# Patient Record
Sex: MALE
[De-identification: names, ages, dates, MRNs, and addresses within clinical notes are randomized per-mention and may not be internally consistent; named-entity substitution may affect disease eponyms.]

---

## 2019-06-11 ENCOUNTER — HOSPITAL ENCOUNTER (OUTPATIENT)
Dept: HOSPITAL 88 - US | Age: 69
End: 2019-06-11
Attending: INTERNAL MEDICINE
Payer: MEDICARE

## 2019-06-11 DIAGNOSIS — I10: ICD-10-CM

## 2019-06-11 DIAGNOSIS — R74.8: Primary | ICD-10-CM

## 2019-06-11 DIAGNOSIS — E66.3: ICD-10-CM

## 2019-06-11 DIAGNOSIS — E10.9: ICD-10-CM

## 2019-06-11 LAB
DEPRECATED APTT PLAS QN: 38.2 SECONDS (ref 23.8–35.5)
DEPRECATED INR PLAS: 0.92
PROTHROMBIN TIME: 12.9 SECONDS (ref 11.9–14.5)

## 2019-06-11 PROCEDURE — 85610 PROTHROMBIN TIME: CPT

## 2019-06-11 PROCEDURE — 85049 AUTOMATED PLATELET COUNT: CPT

## 2019-06-11 PROCEDURE — 76942 ECHO GUIDE FOR BIOPSY: CPT

## 2019-06-11 PROCEDURE — 88307 TISSUE EXAM BY PATHOLOGIST: CPT

## 2019-06-11 PROCEDURE — 36415 COLL VENOUS BLD VENIPUNCTURE: CPT

## 2019-06-11 PROCEDURE — 85730 THROMBOPLASTIN TIME PARTIAL: CPT

## 2019-06-11 PROCEDURE — 47000 NEEDLE BIOPSY OF LIVER PERQ: CPT

## 2019-06-11 NOTE — DIAGNOSTIC IMAGING REPORT
Ultrasound guided non-targeted liver biopsy 



Comparison: None. 



Consent: The nature of the procedure, including its risks, benefits and

alternatives was explained to the patient who understood and gave informed,

written consent. 



Operators: Suhail Dubois MD



Anesthesia: Intravenous conscious sedation was administered by radiology

nursing. Continuous hemodynamic and respiratory monitoring was performed,

including the use of pulse oximetry.



Medications:

IV Fentanyl 100 mcg

IV Versed 2 mg 

10 cc of 1% subcutaneous lidocaine



Specimens: Two 2 cm core biopsies of the right hepatic lobe



Estimated blood loss: No significant blood loss. 

     

TECHNIQUE: 

The patient was placed in the supine position. Ultrasound demonstrated a

satisfactory path to the right hepatic lobe. The skin of the right upper

abdomen was marked, prepped, draped and anesthetized with 1% lidocaine. With

ultrasound guidance, a 16 gauge introducer needle was inserted into the right

hepatic lobe. Subsequently, two 2 cm core biopsies were obtained with an 18

gauge core biopsy device. The samples were placed into Formalin. Gelfoam

embolization of the tract was performed and the introducer needle was removed.

Sterile dressing was placed. 



Samples were sent to pathology. The patient tolerated the procedure well. 



IMPRESSION: 

Ultrasound guided non-targeted core biopsy of the right hepatic lobe as above.



Signed by: Dr. Suhail Dubois MD on 6/11/2019 10:39 AM

## 2023-07-21 ENCOUNTER — HOSPITAL ENCOUNTER (OUTPATIENT)
Dept: HOSPITAL 88 - NM | Age: 73
End: 2023-07-21
Attending: UROLOGY
Payer: MEDICARE

## 2023-07-21 DIAGNOSIS — N13.30: Primary | ICD-10-CM

## 2023-07-21 PROCEDURE — 78708 K FLOW/FUNCT IMAGE W/DRUG: CPT
